# Patient Record
Sex: MALE
[De-identification: names, ages, dates, MRNs, and addresses within clinical notes are randomized per-mention and may not be internally consistent; named-entity substitution may affect disease eponyms.]

---

## 2023-04-20 ENCOUNTER — HOSPITAL ENCOUNTER (EMERGENCY)
Dept: HOSPITAL 90 - EDH | Age: 43
LOS: 1 days | Discharge: ANOTHER HOSPITAL | End: 2023-04-21
Payer: COMMERCIAL

## 2023-04-20 VITALS — BODY MASS INDEX: 45.32 KG/M2 | HEIGHT: 69 IN | WEIGHT: 306 LBS

## 2023-04-20 DIAGNOSIS — Z79.899: ICD-10-CM

## 2023-04-20 DIAGNOSIS — Z90.49: ICD-10-CM

## 2023-04-20 DIAGNOSIS — E11.65: ICD-10-CM

## 2023-04-20 DIAGNOSIS — Z86.718: ICD-10-CM

## 2023-04-20 DIAGNOSIS — Z79.84: ICD-10-CM

## 2023-04-20 DIAGNOSIS — I26.99: Primary | ICD-10-CM

## 2023-04-20 DIAGNOSIS — Z98.890: ICD-10-CM

## 2023-04-20 LAB
ALBUMIN SERPL-MCNC: 3.4 G/DL (ref 3.5–5)
ALT SERPL-CCNC: 167 U/L (ref 12–78)
AST SERPL-CCNC: 67 U/L (ref 10–37)
BASE EXCESS BLDA CALC-SCNC: -6.7 MMOL/L (ref -2–3)
BASOPHILS NFR BLD AUTO: 0.5 % (ref 0–5)
BUN SERPL-MCNC: 14 MG/DL (ref 7–18)
CHLORIDE SERPL-SCNC: 99 MMOL/L (ref 101–111)
CO2 SERPL-SCNC: 21 MMOL/L (ref 21–32)
CREAT SERPL-MCNC: 1.4 MG/DL (ref 0.5–1.5)
EOSINOPHIL NFR BLD AUTO: 0.2 % (ref 0–8)
ERYTHROCYTE [DISTWIDTH] IN BLOOD BY AUTOMATED COUNT: 13.7 % (ref 11–15.5)
FLUAV AG NPH QL IA: (no result)
FLUBV AG NPH QL IA: (no result)
GFR SERPL CREATININE-BSD FRML MDRD: 64 ML/MIN (ref 90–?)
HCO3 BLDA-SCNC: 15.2 MMOL/L (ref 21–28)
HCT VFR BLD AUTO: 50.3 % (ref 42–54)
LYMPHOCYTES NFR SPEC AUTO: 23.2 % (ref 21–51)
MCH RBC QN AUTO: 30.5 PG (ref 27–33)
MCHC RBC AUTO-ENTMCNC: 34.2 G/DL (ref 32–36)
MCV RBC AUTO: 89.2 FL (ref 79–99)
MONOCYTES NFR BLD AUTO: 6.6 % (ref 3–13)
NEUTROPHILS NFR BLD AUTO: 68.6 % (ref 40–77)
NRBC BLD MANUAL-RTO: 0 % (ref 0–0.19)
PCO2 BLDA: 24 MMHG (ref 35–48)
PLATELET # BLD AUTO: 96 K/UL (ref 130–400)
POTASSIUM SERPL-SCNC: 4 MMOL/L (ref 3.5–5.1)
PROT SERPL-MCNC: 7.1 G/DL (ref 6–8.3)
RBC # BLD AUTO: 5.64 MIL/UL (ref 4.5–6.2)
SAO2 % BLDA: 94.7 % (ref 95–99)
SODIUM SERPL-SCNC: 133 MMOL/L (ref 136–145)
WBC # BLD AUTO: 12 K/UL (ref 4.8–10.8)

## 2023-04-20 PROCEDURE — 87804 INFLUENZA ASSAY W/OPTIC: CPT

## 2023-04-20 PROCEDURE — 93005 ELECTROCARDIOGRAM TRACING: CPT

## 2023-04-20 PROCEDURE — 87635 SARS-COV-2 COVID-19 AMP PRB: CPT

## 2023-04-20 PROCEDURE — 71270 CT THORAX DX C-/C+: CPT

## 2023-04-20 PROCEDURE — 99292 CRITICAL CARE ADDL 30 MIN: CPT

## 2023-04-20 PROCEDURE — 36415 COLL VENOUS BLD VENIPUNCTURE: CPT

## 2023-04-20 PROCEDURE — 96365 THER/PROPH/DIAG IV INF INIT: CPT

## 2023-04-20 PROCEDURE — 94640 AIRWAY INHALATION TREATMENT: CPT

## 2023-04-20 PROCEDURE — 85025 COMPLETE CBC W/AUTO DIFF WBC: CPT

## 2023-04-20 PROCEDURE — 83880 ASSAY OF NATRIURETIC PEPTIDE: CPT

## 2023-04-20 PROCEDURE — 82803 BLOOD GASES ANY COMBINATION: CPT

## 2023-04-20 PROCEDURE — 84132 ASSAY OF SERUM POTASSIUM: CPT

## 2023-04-20 PROCEDURE — 84484 ASSAY OF TROPONIN QUANT: CPT

## 2023-04-20 PROCEDURE — 71045 X-RAY EXAM CHEST 1 VIEW: CPT

## 2023-04-20 PROCEDURE — 36600 WITHDRAWAL OF ARTERIAL BLOOD: CPT

## 2023-04-20 PROCEDURE — 96366 THER/PROPH/DIAG IV INF ADDON: CPT

## 2023-04-20 PROCEDURE — 83930 ASSAY OF BLOOD OSMOLALITY: CPT

## 2023-04-20 PROCEDURE — 96375 TX/PRO/DX INJ NEW DRUG ADDON: CPT

## 2023-04-20 PROCEDURE — 84295 ASSAY OF SERUM SODIUM: CPT

## 2023-04-20 PROCEDURE — 85610 PROTHROMBIN TIME: CPT

## 2023-04-20 PROCEDURE — 85378 FIBRIN DEGRADE SEMIQUANT: CPT

## 2023-04-20 PROCEDURE — 85730 THROMBOPLASTIN TIME PARTIAL: CPT

## 2023-04-20 PROCEDURE — 85018 HEMOGLOBIN: CPT

## 2023-04-20 PROCEDURE — 87040 BLOOD CULTURE FOR BACTERIA: CPT

## 2023-04-20 PROCEDURE — 96368 THER/DIAG CONCURRENT INF: CPT

## 2023-04-20 PROCEDURE — 80053 COMPREHEN METABOLIC PANEL: CPT

## 2023-04-20 PROCEDURE — 82947 ASSAY GLUCOSE BLOOD QUANT: CPT

## 2023-04-20 PROCEDURE — 99291 CRITICAL CARE FIRST HOUR: CPT

## 2023-04-20 PROCEDURE — 83605 ASSAY OF LACTIC ACID: CPT

## 2023-04-20 PROCEDURE — 82010 KETONE BODYS QUAN: CPT

## 2023-04-20 PROCEDURE — 82948 REAGENT STRIP/BLOOD GLUCOSE: CPT

## 2023-04-20 PROCEDURE — 82435 ASSAY OF BLOOD CHLORIDE: CPT

## 2023-04-20 PROCEDURE — 80048 BASIC METABOLIC PNL TOTAL CA: CPT

## 2023-04-21 VITALS — DIASTOLIC BLOOD PRESSURE: 97 MMHG | SYSTOLIC BLOOD PRESSURE: 138 MMHG

## 2023-04-21 LAB
APTT PPP: 26.4 SEC (ref 26.3–35.5)
BUN SERPL-MCNC: 15 MG/DL (ref 7–18)
CHLORIDE SERPL-SCNC: 96 MMOL/L (ref 101–111)
CO2 SERPL-SCNC: 15 MMOL/L (ref 21–32)
CREAT SERPL-MCNC: 1.5 MG/DL (ref 0.5–1.5)
GFR SERPL CREATININE-BSD FRML MDRD: 59 ML/MIN (ref 90–?)
GLUCOSE SERPL-MCNC: 483 MG/DL (ref 70–105)
GLUCOSE SERPL-MCNC: 627 MG/DL (ref 70–105)
INR PPP: 1.3 (ref 0.85–1.15)
POTASSIUM SERPL-SCNC: 4.1 MMOL/L (ref 3.5–5.1)
PROTHROMBIN TIME: 14 SEC (ref 9.6–11.6)
SODIUM SERPL-SCNC: 131 MMOL/L (ref 136–145)

## 2025-01-28 ENCOUNTER — HOSPITAL ENCOUNTER (EMERGENCY)
Dept: HOSPITAL 90 - EDH | Age: 45
LOS: 1 days | Discharge: HOME | End: 2025-01-29
Payer: SELF-PAY

## 2025-01-28 VITALS — BODY MASS INDEX: 38.36 KG/M2 | HEIGHT: 71 IN | WEIGHT: 274.03 LBS

## 2025-01-28 DIAGNOSIS — Z79.899: ICD-10-CM

## 2025-01-28 DIAGNOSIS — Z90.49: ICD-10-CM

## 2025-01-28 DIAGNOSIS — Z98.890: ICD-10-CM

## 2025-01-28 DIAGNOSIS — J32.9: ICD-10-CM

## 2025-01-28 DIAGNOSIS — Z79.84: ICD-10-CM

## 2025-01-28 DIAGNOSIS — Z79.01: ICD-10-CM

## 2025-01-28 DIAGNOSIS — E11.65: Primary | ICD-10-CM

## 2025-01-28 LAB
APPEARANCE UR: CLEAR
APTT PPP: 29.6 SEC (ref 26.3–35.5)
BASE EXCESS BLDA CALC-SCNC: -2.5 MMOL/L (ref -2–3)
BASOPHILS # BLD AUTO: 0.04 K/UL (ref 0–0.2)
BASOPHILS NFR BLD AUTO: 0.5 % (ref 0–5)
BILIRUB UR QL STRIP: NEGATIVE MG/DL
BNP SERPL-MCNC: 14 PG/ML (ref 0–100)
BUN SERPL-MCNC: 12 MG/DL (ref 7–18)
CHLORIDE SERPL-SCNC: 91 MMOL/L (ref 101–111)
CK SERPL-CCNC: 31 U/L (ref 21–232)
CO2 SERPL-SCNC: 26 MMOL/L (ref 21–32)
COHGB BLD-MCNC: 0 % (ref 0.5–1.5)
COLOR UR: YELLOW
CREAT SERPL-MCNC: 1 MG/DL (ref 0.5–1.3)
DEPRECATED SQUAMOUS URNS QL MICRO: (no result) /HPF (ref 0–2)
EOSINOPHIL # BLD AUTO: 0.19 K/UL (ref 0–0.7)
EOSINOPHIL NFR BLD AUTO: 2.4 % (ref 0–8)
ERYTHROCYTE [DISTWIDTH] IN BLOOD BY AUTOMATED COUNT: 11.9 % (ref 11–15.5)
GAS PNL BLDA: 37 CELSIUS (ref 35.5–37)
GFR SERPL CREATININE-BSD FRML MDRD: 95 ML/MIN (ref 90–?)
GLUCOSE SERPL-MCNC: 366 MG/DL (ref 70–105)
GLUCOSE UR STRIP-MCNC: >=1000 MG/DL
HCO3 BLDA-SCNC: 19.4 MMOL/L (ref 21–28)
HCT VFR BLD AUTO: 52.2 % (ref 42–54)
HGB UR QL STRIP: (no result)
IMM GRANULOCYTES # BLD: 0.1 K/UL (ref 0–1)
INR PPP: 1.02 (ref 0.85–1.15)
KETONES UR STRIP-MCNC: 40 MG/DL
LEUKOCYTE ESTERASE UR QL STRIP: NEGATIVE LEU/UL
LYMPHOCYTES # SPEC AUTO: 1.3 K/UL (ref 1–4.8)
LYMPHOCYTES NFR SPEC AUTO: 17 % (ref 21–51)
MAGNESIUM SERPL-MCNC: 1.7 MG/DL (ref 1.8–2.4)
MCH RBC QN AUTO: 31.1 PG (ref 27–33)
MCHC RBC AUTO-ENTMCNC: 35.8 G/DL (ref 32–36)
MCV RBC AUTO: 86.9 FL (ref 79–99)
MICRO URNS: YES
MONOCYTES # BLD AUTO: 1 K/UL (ref 0.1–1)
MONOCYTES NFR BLD AUTO: 12.1 % (ref 3–13)
MUCOUS THREADS URNS QL MICRO: (no result) LPF
NEUTROPHILS # BLD AUTO: 5.2 K/UL (ref 1.8–7.7)
NEUTROPHILS NFR BLD AUTO: 66.7 % (ref 40–77)
NITRITE UR QL STRIP: NEGATIVE
NRBC BLD MANUAL-RTO: 0 % (ref 0–0.19)
PCO2 BLDA: 28 MMHG (ref 35–48)
PH BLDA: 7.46 [PH] (ref 7.35–7.45)
PH UR STRIP: 6 [PH] (ref 5–8)
PLATELET # BLD AUTO: 148 K/UL (ref 130–400)
PO2 BLDA: 64.2 MMHG (ref 83–108)
POTASSIUM SERPL-SCNC: 3.6 MMOL/L (ref 3.5–5.1)
PROT UR QL STRIP: 70 MG/DL
PROTHROMBIN TIME: 11.4 SEC (ref 9.6–11.6)
RBC # BLD AUTO: 6.01 MIL/UL (ref 4.5–6.2)
RBC #/AREA URNS HPF: (no result) /HPF (ref 0–1)
SAO2 % BLDA: 94.2 % (ref 94–98)
SODIUM SERPL-SCNC: 126 MMOL/L (ref 136–145)
SP GR UR STRIP: 1.04 (ref 1–1.03)
UROBILINOGEN UR STRIP-MCNC: 0.2 MG/DL (ref 0.2–1)
VENTILATION MODE VENT: (no result)
WBC # BLD AUTO: 7.8 K/UL (ref 4.8–10.8)
WBC #/AREA URNS HPF: (no result) /HPF (ref 0–1)

## 2025-01-28 PROCEDURE — 71045 X-RAY EXAM CHEST 1 VIEW: CPT

## 2025-01-28 PROCEDURE — 85018 HEMOGLOBIN: CPT

## 2025-01-28 PROCEDURE — 93005 ELECTROCARDIOGRAM TRACING: CPT

## 2025-01-28 PROCEDURE — 36600 WITHDRAWAL OF ARTERIAL BLOOD: CPT

## 2025-01-28 PROCEDURE — 96366 THER/PROPH/DIAG IV INF ADDON: CPT

## 2025-01-28 PROCEDURE — 85730 THROMBOPLASTIN TIME PARTIAL: CPT

## 2025-01-28 PROCEDURE — 82550 ASSAY OF CK (CPK): CPT

## 2025-01-28 PROCEDURE — 85025 COMPLETE CBC W/AUTO DIFF WBC: CPT

## 2025-01-28 PROCEDURE — 81001 URINALYSIS AUTO W/SCOPE: CPT

## 2025-01-28 PROCEDURE — 83880 ASSAY OF NATRIURETIC PEPTIDE: CPT

## 2025-01-28 PROCEDURE — 84132 ASSAY OF SERUM POTASSIUM: CPT

## 2025-01-28 PROCEDURE — 80048 BASIC METABOLIC PNL TOTAL CA: CPT

## 2025-01-28 PROCEDURE — 83605 ASSAY OF LACTIC ACID: CPT

## 2025-01-28 PROCEDURE — 84295 ASSAY OF SERUM SODIUM: CPT

## 2025-01-28 PROCEDURE — 96365 THER/PROPH/DIAG IV INF INIT: CPT

## 2025-01-28 PROCEDURE — 82947 ASSAY GLUCOSE BLOOD QUANT: CPT

## 2025-01-28 PROCEDURE — 85610 PROTHROMBIN TIME: CPT

## 2025-01-28 PROCEDURE — 83735 ASSAY OF MAGNESIUM: CPT

## 2025-01-28 PROCEDURE — 36415 COLL VENOUS BLD VENIPUNCTURE: CPT

## 2025-01-28 PROCEDURE — 82435 ASSAY OF BLOOD CHLORIDE: CPT

## 2025-01-28 PROCEDURE — 82803 BLOOD GASES ANY COMBINATION: CPT

## 2025-01-28 PROCEDURE — 99285 EMERGENCY DEPT VISIT HI MDM: CPT

## 2025-01-28 PROCEDURE — 84484 ASSAY OF TROPONIN QUANT: CPT

## 2025-01-28 NOTE — EKG
[facility]

                                       

Test Date:    2025               Test Time:    19:42:22

Pat Name:     NAINA ALONZO            Department:   [facility]

Patient ID:   [id number]           Room:          

Gender:       M                        Technician:   [id number]

:          1980               Requested By: DRE HARO

Order Number: [id number]        Reading MD:   Dell Saleh

                                 Measurements

Intervals                              Axis          

Rate:         126                      P:            69

KS:           174                      QRS:          -78

QRSD:         93                       T:            7

QT:           306                                    

QTc:          444                                    

                           Interpretive Statements

Sinus tachycardia

Left anterior fascicular block

Compared to ECG 2023 23:27:57

Left anterior fascicular block now present

Myocardial infarct finding no longer present

Electronically Signed On 2025 20:02:02 CST by Dell Saleh



Please click the below link to view image of tracing.

## 2025-01-28 NOTE — ERN
General


Stated Complaint:  HIGH BLOOD SUGAR, HEADACHE, SWEATING


Time Seen by MD:  19:21


Source:  patient





History of Present Illness


Initial Comments


A 44-year-old male coming in to be evaluated for generalized body weakness.  

Patient states he checked his blood glucose and it was 0409.  Patient is a 

diabetic.  Patient states he felt really weak left-sided weakness.


Allergies:  


Coded Allergies:  


     No Known Allergies (Unverified  Allergy, Unknown, 1/26/22)


Home Meds


Active Scripts


Metformin HCl (Metformin HCl) 500 Mg Tablet, 500 MG PO BID, #60 TAB 0 Refills


   Prov:RSOSMARY JANE BENJAMIN AGPCNP         1/29/22


Apixaban (Eliquis) 5 Mg Tablet, 5 MG PO BID, #30 TAB 2 Refills


   Prov:MARY JANE ROSS AGPCNP         1/29/22


Pantoprazole Sodium (Protonix) 40 Mg Tablet.dr, 40 MG PO DAILY, #30 TAB 0 

Refills


   Prov:CODYMARY JANE B AGPCNP         1/29/22


Apixaban (Eliquis) 5 Mg Tablet, 10 MG PO BID, #10 TAB 0 Refills


   Prov:MARY JANE ROSS SOURAV AGPCNP         1/29/22





Past Medical History


Past Medical History:  Diabetes-Type II, DVT


Past Surgical History:  Cholecystectomy


Surgical History Other:  RIGHT HAND SURGERY





Family History


Family History:  HTN





Social History


Social History:  Negative, Lives with family





ROS Dictation


CONSTITUTIONAL:  No chills, no fever, no weakness, no diaphoresis, no malaise.


HEAD/FACE:  No signs of trauma. 


EENT:  No eye pain, no blurred vision, no tearing, no double vision, no ear 

pain, no ear discharge, no nose pain, no nasal congestion, no throat pain, no 

throat swelling, no mouth pain. 


RESPIRATORY:  No cough, no orthopnea, no SOB, no stridor, no wheezing. 


CARDIOVASCULAR:  No chest pain, no edema, no palpitations, no syncope. 


GASTROINTESTINAL/ABDOMINAL:   No abdominal pain, no constipation, no diarrhea, 

no nausea, no vomiting. 


GENITOURINARY:  No abnormal discharge, no dysuria, no frequent urination, no 

hematuria. No complaints of pain in the genitals. 


MUSCULOSKELETAL:  No back pain, no gout, no joint pain, no joint swelling, no 

muscle pain, no muscle stiffness, no neck pain. 


INTEGUMENTARY:  No change in color, no change in hair/nails, no dryness, no 

lesion, no lumps, no rash. 


NEUROLOGICAL/PSYCH:  No anxiety, not depressed, no emotional problem, no 

headache, no numbness, no pre-existing deficit, no history of seizures,  no 

tremors, no weakness. 


HEMATOLOGIC/LYMPHATIC:  Not anemic, no history of blood clots, no apparent 

bleeding, no bruising, glands not swollen.


All Systems Negative, Except as Noted.





Physical Exam


Physical Exam Dictation


VITAL SIGNS:  Reviewed. 


GENERAL APPEARANCE:  Alert, oriented x3, no acute distress, obese.


HEAD AND FACE: Non-traumatic. 


EYES:   PERRL, pink conjunctivas, eyelid no trauma, anterior chamber clear. 


EARS:  Pinnas intact and no signs of trauma or erythema.  Ear canals clear and 

no discharge. TMs no erythema. 


NOSE:  No discharge, no bleeding. 


OROPHARYNX:  Mouth normal, teeth no caries, tongue pink.  Pharynx clear, no 

erythema.  Tonsils no exudates, no abscesses noted. Mucous membrane moist.


NECK:  Supple, non-tender, no thyromegaly, no masses, no JVD, no bruits. 


BREAST:  Deferred.


CHEST:   No tenderness, no crepitus, no paradoxical movement, no retractions.


LUNGS:  Clear, well-ventilated, symmetric, no rales, no wheezing, no rhonchi, no

stridor, good breath sounds bilaterally. 


HEART:  Regular rate, regular rhythm, no murmur, no gallops. 


VASCULAR: No peripheral edema.


ABDOMEN: Soft, positive bowel sounds, nondistended, no guarding, nontender, no 

rebound, no masses no hepatomegaly, no splenomegaly, no Lim's sign, no 

hernias. 


RECTAL: Deferred. 


GENITAL:  Deferred. 


NEUROLOGICAL:  Normal speech, gross motor function intact, gross sensory 

function intact.


MUSCULOSKELETAL:  Neck nontender, full range of motion, back nontender, full 

range of motion.


EXTREMITIES: Nontender, full range of motion. 


SKIN:  Color pink, dry, no turgor, no rash, no lacerations, no abrasions, no 

contusions.


LYMPHATICS:  Deferred.





Results


Laboratory and Microbiology


Lab and Micro Result





Laboratory Tests








Test


 1/28/25


19:53 1/28/25


20:23 1/28/25


20:36


 


White Blood Count


 7.8 K/uL


(4.8-10.8) 


 





 


Red Blood Count


 6.01 MIL/uL


(4.50-6.20) 


 





 


Hemoglobin


 18.7 g/dL


(14.0-18.0)  H 


 





 


Hematocrit


 52.2 % (42-54)


 


 





 


Mean Corpuscular Volume


 86.9 fL


(79-99) 


 





 


Mean Corpuscular Hemoglobin


 31.1 pg


(27.0-33.0) 


 





 


Mean Corpuscular Hemoglobin


Concent 35.8 g/dL


(32.0-36.0) 


 





 


Red Cell Distribution Width


 11.9 %


(11.0-15.5) 


 





 


Platelet Count


 148 K/uL


(130-400) 


 





 


Mean Platelet Volume


 11.7 fL


(7.5-10.5)  H 


 





 


Immature Granulocyte % (Auto) 1.3 % (0-1)  H  


 


Neutrophils (%) (Auto)


 66.7 %


(40.0-77.0) 


 





 


Lymphocytes (%) (Auto)


 17.0 %


(21.0-51.0)  L 


 





 


Monocytes (%) (Auto)


 12.1 %


(3.0-13.0) 


 





 


Eosinophils (%) (Auto)


 2.4 %


(0.0-8.0) 


 





 


Basophils (%) (Auto)


 0.5 %


(0.0-5.0) 


 





 


Neutrophils # (Auto)


 5.2 K/uL


(1.8-7.7) 


 





 


Lymphocytes # (Auto)


 1.3 K/uL


(1.0-4.8) 


 





 


Monocytes # (Auto)


 1.0 K/uL


(0.1-1.0) 


 





 


Eosinophils # (Auto)


 0.19 K/uL


(0.00-0.70) 


 





 


Basophils # (Auto)


 0.04 K/uL


(0.00-0.20) 


 





 


Absolute Immature Granulocyte


(auto 0.10 K/uL


(0-1) 


 





 


Nucleated Red Blood Cells


 0.0 %


(0.0-0.19) 


 





 


Prothrombin Time


 11.4 SEC


(9.6-11.6) 


 





 


Prothromb Time International


Ratio 1.02


(0.85-1.15) 


 





 


Activated Partial


Thromboplast Time 29.6 SEC


(26.3-35.5) 


 





 


Sodium Level


 126 mmol/L


(136-145)  L 


 





 


Potassium Level


 3.6 mmol/L


(3.5-5.1) 


 





 


Chloride Level


 91 mmol/L


(101-111)  L 


 





 


Carbon Dioxide Level


 26 mmol/L


(21-32) 


 





 


Blood Urea Nitrogen


 12 mg/dL


(7-18) 


 





 


Creatinine


 1.0 mg/dL


(0.5-1.3) 


 





 


Glomerular Filtration Rate


Calc 95 mL/min


(>90) 


 





 


Random Glucose


 366 mg/dL


()  H 


 





 


Total Calcium


 9.2 mg/dL


(8.5-10.1) 


 





 


Magnesium Level


 1.70 mg/dL


(1.80-2.40)  L 


 





 


Total Creatine Kinase


 31 U/L


() 


 





 


Troponin I High Sensitivity 6 ng/L (4-75)    


 


B-Type Natriuretic Peptide


 14 pg/mL


(0-100) 


 





 


Urine Color


 


 YELLOW


(YELLOW) 





 


Urine Appearance  CLEAR (CLEAR)   


 


Urine pH  6.0 (5.0-8.0)   


 


Urine Specific Gravity


 


 1.037


(1.001-1.031) 





 


Urine Protein


 


 70 mg/dL


(NEGATIVE)  H 





 


Urine Glucose (UA)


 


 >=1000 mg/dL


(NEGATIVE)  H 





 


Urine Ketones


 


 40 mg/dL


(NEGATIVE)  H 





 


Urine Occult Blood


 


 +- (TRACE)


(NEGATIVE)  H 





 


Urine Nitrate


 


 NEGATIVE


(NEGATIVE) 





 


Urine Bilirubin


 


 NEGATIVE mg/dL


(NEGATIVE) 





 


Urine Urobilinogen


 


 0.2 mg/dL


(0.2-1.0) 





 


Urine Leukocyte Esterase


 


 NEGATIVE


Alex/uL 





 


Urine RBC


 


 2-5 /HPF (0-1)


H 





 


Urine WBC


 


 0-1 /HPF (0-1)


 





 


Urine Squamous Epithelial


Cells 


 RARE /HPF


(0-2) 





 


Urine Bacteria


 


 None /HPF


(None Seen) 





 


Blood Gas Specimen Type   Arterial  


 


Arterial Blood pH


 


 


 7.456


(7.350-7.450)


 


Arterial Blood Partial


Pressure CO2 


 


 28 mmHg


(35-48)  L


 


Arterial Blood Partial


Pressure O2 


 


 64.2 mmHg


(83.0-108.0)  L


 


Arterial Blood HCO3


 


 


 19.4 mmol/L


(21.0-28.0)  L


 


Arterial Blood Oxygen


Saturation 


 


 94.2 %


(94.0-98.0)


 


Arterial Blood Base Excess


 


 


 -2.5 mmol/L


(-2.0-3.0)  L


 


Hemoglobin (Blood Gas)


 


 


 19.9 g/dL


(13.5-17.5)  *H


 


Sodium (Blood Gas)


 


 


 124 MMOL/L


(136-145)  L


 


Bedside Potassium (Blood Gas)


 


 


 3.9 MMOL/L


(3.4-4.5)


 


Bedside Chloride (Blood Gas)


 


 


 95 MMOL/L


()  L


 


Bedside Glucose (Blood Gas)


 


 


 369 MG/DL


(65-95)  H


 


Bedside Ionized Calcium


(Blood Gas) 


 


 1.20 MMOL/L


(1.15-1.33)


 


Bedside Lactic Acid (Blood


Gas) 


 


 1.94 MMOL/L


(0.36-0.75)  H


 


Blood Gas Temperature


 


 


 37.0 CELSIUS


(35.5-37.0)


 


Blood Gas Vent Mode


 


 


 ROOMAIR (ROOM


AIR)


 


FiO2   21.0 %  


 


Blood Gas Specimen Comment   RB P JENSEN  








Labs Reviewed?:  Yes





EKG/XRAY/US/CT/MRI


EKG Comment


01/28/2025 time 7:42 p.m.


Ventricular rate 126


Sinus tachycardia





No ST wave elevation or depression





MDM


MDM: 


Differential diagnosis:  Dehydration, diabetes mellitus hyperglycemia, 

sinusitis,


Patient is a 44-year-old gentleman coming in to be evaluated for generalized 

body weakness he checked his sugar and he states that it was in the 400s.  He 

was a diabetic.  Patient was evaluated with cardiac workup glucose was in the 

300s patient received 2 L of fluid due to hemoconcentration.  Patient will be 

discharged in stable condition.  Before discharging patient also states that he 

had bilateral ear discomfort I evaluated ears right-sided cerumen impaction left

side tympanic membrane erythema as well as bilateral nasal turbinate swelling 

and oropharyngeal erythema.  Patient will be discharged with a diagnosis of 

sinusitis with diabetes mellitus uncontrolled.


ED Course





Orders








Procedure Category Date Status





  Time 


 


Cbc With Differential LAB 1/28/25 Complete





  19:24 


 


Prothrombin Time With LAB 1/28/25 Complete





INR  19:24 


 


B-Type Natriuretic LAB 1/28/25 Complete





Peptide  19:24 


 


Chest 1vw RAD 1/28/25 Resulted





  19:24 


 


12 Lead Ekg Tracing- EKG 1/28/25 Complete





Technical  19:24 


 


Lactated Ringers PHA 1/28/25 Complete





1000ml (Lactated  19:30 


 


Magnesium LAB 1/28/25 Complete





  19:24 


 


Creatine Kinase, Total LAB 1/28/25 Complete





  19:24 


 


Troponin I High LAB 1/28/25 Complete





Sensitivity  19:24 


 


Urinalysis Profile LAB 1/28/25 Complete





  19:24 


 


Partial LAB 1/28/25 Complete





Thromboplastin Time  19:24 


 


Basic Metabolic Panel LAB 1/28/25 Complete





  19:24 


 


Arterial Blood Gas + RT 1/28/25 Transmitted





  19:24 


 


Magnesium 2gm Premix PHA 1/28/25 In Process





50ml (Magnesium 2gm  21:00 


 


Arterial Blood Gas LAB 1/28/25 Complete





Arterial +  20:36 


 


0.9%Nacl 1000ml (Ns PHA 1/29/25 Complete





1000ml)  02:30 








Current Medications








 Medications


  (Trade)  Dose


 Ordered  Sig/Chetan


 Route


 PRN Reason  Start Time


 Stop Time Status Last Admin


Dose Admin


 


 Lactated Ringer's  1,000 ml @ 


 0 mls/hr  ONCE  ONCE


 IV


   1/28/25 19:30


 1/28/25 19:31 DC 1/29/25 01:02





 


 Magnesium Sulfate  50 ml @ 0


 mls/hr  PROTOCOL


 IV


   1/28/25 21:00


 2/27/25 20:59  1/29/25 01:03





 


 Sodium Chloride  1,000 ml @ 


 0 mls/hr  ONCE  ONCE


 IV


   1/29/25 02:30


 1/29/25 02:31 DC 1/29/25 02:46











Vital Signs








  Date Time  Temp Pulse Resp B/P (MAP) Pulse Ox O2 Delivery O2 Flow Rate FiO2


 


1/28/25 20:19 100.0 117 20 143/103 95 Room Air  











DX & DISP


Disposition:  Discharge


Departure


Impression:  


   Primary Impression:  Hyperglycemia


   Additional Impression:  Sinusitis


Condition:  Stable


Scripts


Amoxicillin (Amoxicillin) 500 Mg Capsule


1 CAP PO TID for 10 Days, #30 CAP 0 Refills


   Prov: DRE HARO MD         1/29/25 


Naproxen (Naproxen) 375 Mg Tablet.


375 MG PO BID for 10 Days, #20 TAB


   Prov: DRE HARO MD         1/29/25





Additional Instructions:  


FOLLOW-UP WITH PRIMARY CARE PROVIDER IN 1 TO 2 DAYS.  TAKE MEDICATIONS AS 

DIRECTED HERE IN THE EMERGENCY ROOM.  OKAY TO CONTINUE HOME MEDICATIONS UNLESS 

OTHERWISE DISCUSSED DURING YOUR VISIT IN THE EMERGENCY ROOM TODAY.  RETURN TO 

YOUR NEAREST EMERGENCY ROOM IF SYMPTOMS WORSEN OR IF THERE IS NO IMPROVEMENT.  

CALL 911 IF YOU NEED IMMEDIATE ASSISTANCE.  TAKE TYLENOL  OVER-THE-COUNTER AS 

NEEDED AND IF NO CONTRAINDICATIONS ARE PRESENT.  INCREASE ORAL HYDRATION.  A 

WOUND CULTURE OR URINE CULTURE WAS ORDERED HERE IN THE EMERGENCY ROOM DEPARTMENT

PLEASE FOLLOW-UP WITH PRIMARY CARE PROVIDER AND ADVISE THEM TO GET REPEAT PORTS 

FROM OUR FACILITY.  IF YOU HAD ANY ACE WRAP/SPLINTS THAT WERE APPLIED HERE, 

PLEASE DO NOT REMOVE THEM UNTIL YOU SEE YOUR PRIMARY CARE OR SPECIALTY.





Referrals:


Referrals:  


SELF,REFERRAL (PCP)








MIREYA SIMMONS MD


Time of Disposition:  03:02











DRE HARO MD                Jan 28, 2025 19:28

## 2025-01-28 NOTE — HMCIMG
CHEST 1VW



REASON: cp



COMPARISON: None.



FINDINGS:  

There are increased perihilar markings which could be early pneumonia or

edema, most prominent on the right. Lungs are otherwise clear. Heart

size is normal. There is no pleural effusion. Mediastinum and bony

thorax appear unremarkable.



IMPRESSION:



1. Mild perihilar infiltrate predominantly on the right, this could be

early pneumonia or edema.

## 2025-01-29 VITALS
DIASTOLIC BLOOD PRESSURE: 92 MMHG | RESPIRATION RATE: 18 BRPM | HEART RATE: 94 BPM | SYSTOLIC BLOOD PRESSURE: 138 MMHG | TEMPERATURE: 98.1 F | OXYGEN SATURATION: 98 %

## 2025-01-29 RX ADMIN — SODIUM CHLORIDE ONE MLS/HR: 0.9 INJECTION, SOLUTION INTRAVENOUS at 02:46

## 2025-01-29 RX ADMIN — Medication ONE MLS/HR: at 01:02

## 2025-01-29 RX ADMIN — MAGNESIUM SULFATE IN WATER SCH MLS/HR: 40 INJECTION, SOLUTION INTRAVENOUS at 01:03
